# Patient Record
Sex: MALE | Race: BLACK OR AFRICAN AMERICAN | NOT HISPANIC OR LATINO | Employment: UNEMPLOYED | RURAL
[De-identification: names, ages, dates, MRNs, and addresses within clinical notes are randomized per-mention and may not be internally consistent; named-entity substitution may affect disease eponyms.]

---

## 2021-01-22 ENCOUNTER — CLINICAL SUPPORT (OUTPATIENT)
Dept: AUDIOLOGY | Facility: CLINIC | Age: 56
End: 2021-01-22

## 2021-01-22 ENCOUNTER — OFFICE VISIT (OUTPATIENT)
Dept: OTOLARYNGOLOGY | Facility: CLINIC | Age: 56
End: 2021-01-22

## 2021-01-22 VITALS — BODY MASS INDEX: 27.6 KG/M2 | HEIGHT: 62 IN | WEIGHT: 150 LBS | TEMPERATURE: 96.2 F

## 2021-01-22 DIAGNOSIS — H90.42 SENSORINEURAL HEARING LOSS (SNHL) OF LEFT EAR WITH UNRESTRICTED HEARING OF RIGHT EAR: Primary | ICD-10-CM

## 2021-01-22 DIAGNOSIS — H93.12 TINNITUS OF LEFT EAR: ICD-10-CM

## 2021-01-22 PROCEDURE — 99204 OFFICE O/P NEW MOD 45 MIN: CPT | Performed by: OTOLARYNGOLOGY

## 2021-01-22 PROCEDURE — 92567 TYMPANOMETRY: CPT | Performed by: AUDIOLOGIST

## 2021-01-22 PROCEDURE — 92557 COMPREHENSIVE HEARING TEST: CPT | Performed by: AUDIOLOGIST

## 2021-01-22 RX ORDER — AMLODIPINE BESYLATE 10 MG/1
10 TABLET ORAL DAILY
COMMUNITY

## 2021-01-22 RX ORDER — INSULIN GLARGINE 100 [IU]/ML
INJECTION, SOLUTION SUBCUTANEOUS
COMMUNITY

## 2021-01-22 RX ORDER — SIMVASTATIN 20 MG
20 TABLET ORAL NIGHTLY
COMMUNITY

## 2021-01-22 RX ORDER — LISINOPRIL 20 MG/1
20 TABLET ORAL DAILY
COMMUNITY

## 2021-01-22 RX ORDER — OMEPRAZOLE 20 MG/1
20 CAPSULE, DELAYED RELEASE ORAL DAILY
COMMUNITY

## 2021-01-22 RX ORDER — CLOPIDOGREL BISULFATE 75 MG/1
75 TABLET ORAL DAILY
COMMUNITY

## 2021-01-22 NOTE — PROGRESS NOTES
"Bree Aguilar is a 55 y.o. male.       History of Present Illness   Patient is here for evaluation of his left ear.  He says he previously saw Dr. Choi, and ear nose and throat doctor who had clinic in [facility].  He has had hearing loss in his left ear for about 2 years.  The patient states that he had an MRI scan done.  This is actually available and personally reviewed along with the radiologist report.  Patient was apparently led to believe that he had a mass in his ear that needed some kind of surgery but that Dr. Choi had retired.  Patient has had at least two cerebrovascular accidents.  The MRI scan did not show any evidence of an acoustic neuroma nor any mass in the external ear.  There were cerebellar changes consistent with his history of cerebrovascular accidents.  Patient says that he had some kind of \"knot\" taken off his right ear many years ago but otherwise has not had any otologic surgery.      The following portions of the patient's history were reviewed and updated as appropriate: allergies, current medications, past family history, past medical history, past social history, past surgical history and problem list.     reports that he has been smoking. He has never used smokeless tobacco.   Patient is not a tobacco user and has not been counseled for use of tobacco products      Review of Systems        Objective   Physical Exam  Ears: External ears no deformity, canals no discharge, tympanic membranes intact clear and mobile bilaterally.  Facial nerve function is intact bilaterally.  Facial sensation appears intact.    Audiogram is obtained and reviewed and shows hearing entirely normal on the right and a flat moderate sensorineural hearing loss on the left with 36% discrimination on the left 96% on the right.  Type a tympanograms bilaterally.      Assessment/Plan   Diagnoses and all orders for this visit:    1. Sensorineural hearing loss (SNHL) of left ear with unrestricted " hearing of right ear (Primary)      Plan: Patient's hearing loss is consistent with his history of cerebrovascular accident.  I reassured him he does not have any kind of growth or tumor that would be amenable to surgery.  He could consider getting a hearing aid for his left ear, but with his right ear being entirely normal this is elective on his part.  He should avoid unnecessary exposure to loud noise and use ear protection when unavoidably around loud noise.  I will be happy to see him again at Dr. Ratliff's discretion.  He should call right away if he experiences any hearing loss in his right ear.

## 2021-01-22 NOTE — PROGRESS NOTES
STANDARD AUDIOMETRIC EVALUATION      Name:  Marcos Aguilar  :  1965  Age:  55 y.o.  Date of Evaluation:  2021      HISTORY    Reason for visit:  Marcos Aguilar is seen today for a hearing test at the request of Dr. José Antonio Perera.  Patient reports he has a hearing loss in his left which happened about 1-2 years ago and seems like It happened overnight.  He states he can hear a low sound in his left ear.  Reportedly, he was told he had a mass in his left ear.        EVALUATION    See Audiogram    RESULTS        Otoscopy and Tympanometry 226 Hz :  Right Ear:  Otoscopy:  Testing completed after ears were examined by the ENT physician          Tympanometry:  Middle ear function within normal limits    Left Ear:   Otoscopy:  Testing completed after ears were examined by the ENT physician        Tympanometry:  Middle ear function within normal limits    Test technique:  Standard Audiometry     Pure Tone Audiometry:   Patient responded to pure tones at 10-20 dB for 250-8000 Hz in right ear, and at 50-65 dB for 250-8000 Hz in left ear.       Speech Audiometry:        Right Ear:  Speech Reception Threshold (SRT) was obtained at 15 dBHL                 Speech Discrimination scores were 96% in quiet when words were presented at 55 dBHL       Left Ear:  Speech Reception Threshold (SRT) was obtained at 60 dBHL, masked                 Speech Discrimination scores were 36% in masking noise when words were presented at  90 dBHL    Reliability:   good    IMPRESSIONS:  1.  Tympanometry results are consistent with Middle ear function within normal limits in both ears.  2.  Pure tone results are consistent with hearing sensitivity within normal limits for right ear, and moderate flat sensorineural hearing loss  in left ear.       RECOMMENDATIONS:  Patient is seeing the Ear Nose and Throat physician immediately following this examination.  It was a pleasure seeing Marcos Aguilar in Audiology today.  We would be happy to do further  testing or discuss these test as necessary.          This document has been electronically signed by Jenifer Nichols MS CCC-JOLLY on January 22, 2021 12:15 CST       Jenifer Nichols MS CCC-A  Licensed Audiologist